# Patient Record
Sex: FEMALE | Race: WHITE | URBAN - METROPOLITAN AREA
[De-identification: names, ages, dates, MRNs, and addresses within clinical notes are randomized per-mention and may not be internally consistent; named-entity substitution may affect disease eponyms.]

---

## 2019-07-12 NOTE — PROGRESS NOTES
[address] [place] [address] Mathias Moritz [address], [address] [facility] [phone]  F [phone]    Office Note  Patient ID:  Name:  Bridget Callejas  MRN:  [id number]  :  1955/63 y.o. Date:  7/15/2019      HISTORY OF PRESENT ILLNESS:  Bridget Callejas is a 61 y.o.  postmenopausal female who is being seen for recurrent, metastatic, endometrial cancer. She is referred by Dr. Natanael Jimenez, medical oncology, in [address] [place] [address]. Dr. Reyna Gil saw her as a consult in the hospital there for pelvic masses. She had presented to the hospital with abdominal pain and had reported poor appetite and weight loss over the last year. A CT scan in revealed a large, lobulated, well-defined, soft tissue mass in the right pelvis with central necrosis, a smaller mass in the left pelvis, and multiple rim-enhancing lesions in the liver. She was also noted to have left hydronephrosis and ultimately had a left nephrostomy tube placed. An antegrade stent was placed later and the nephrostomy was removed. She reported that she was diagnosed with endometrial cancer in  and underwent a robotic hysterectomy with Dr. Neo Peterson at [facility]. She reports being told that the cancer was confined to the endometrium. She was not recommended any adjuvant therapy after surgery and was followed for 5 years without evidence of recurrence. A biopsy of the right-sided mass revealed an adenocarcinoma that was positive for ER/[place] and CK7 and was negative for CK20 and TTF1. These findings would be consistent with a recurrent endometrial cancer. A CA 19-9 was markedly elevated at 1244. A CEA was normal at 1.38. I do not see a CA-125 being drawn. I have been asked by Dr. Reyna Gil to see her in consultation for further evaluation and recommendations. She presents today with her son and daughter. Her son is a radiologist at the [employer] [facility] [employer] in [place].          ROS:   and GI review:  Negative  Cardiopulmonary review: Negative   Musculoskeletal:  Negative    A comprehensive review of systems was negative except for that written in the History of Present Illness. , 10 point ROS      OB/GYN ROS:    Hx of robotic TLH, BSO, PLND in   Patient denies any abnormal bleeding or vaginal discharge. Problem List:  There are no active problems to display for this patient. PMH:  Past Medical History:   Diagnosis Date    Endometrial cancer ([address]       PSH:  Past Surgical History:   Procedure Laterality Date    HX LITHOTRIPSY      HX OTHER SURGICAL  2019    stint left kidney    HX GABRIELE AND BSO      Endometrial Cancer  [facility]      Social History:  Social History     Tobacco Use    Smoking status: Former Smoker    Smokeless tobacco: Never Used   Substance Use Topics    Alcohol use: Not on file      Family History:  Family History   Problem Relation Age of Onset    Breast Cancer Mother       Medications: (reviewed)  Current Outpatient Medications   Medication Sig    LORazepam (ATIVAN) 0.5 mg tablet TAKE 1 TABLET BY MOUTH 3 TIMES A DAY AS NEEDED    megestrol (MEGACE) 40 mg tablet     ondansetron (ZOFRAN ODT) 4 mg disintegrating tablet     PARoxetine (PAXIL) 10 mg tablet      No current facility-administered medications for this visit. Allergies: (reviewed)  No Known Allergies       OBJECTIVE:    Physical Exam:  VITAL SIGNS: Vitals:    07/15/19 1328   BP: 123/85   Pulse: (!) 111   Weight: 157 lb 3.2 oz (71.3 kg)   Height: 5' 5\" (1.651 m)     Body mass index is 26.16 kg/m². GENERAL SHERRILL: Conversant, alert, oriented. No acute distress. HEENT: HEENT. No thyroid enlargement. No JVD. Neck: Supple without restrictions. RESPIRATORY: Clear to auscultation and percussion to the bases. No CVAT. CARDIOVASC: RRR without murmur/rub.    GASTROINT: soft, non-distended, palpable mass on the right side of the abdomen that was mildly tender   MUSCULOSKEL: no joint tenderness, deformity or swelling   EXTREMITIES: extremities normal, atraumatic, no cyanosis or edema   PELVIC: Normal external genitalia. Normal vagina. Cervix, uterus, and adnexa surgically absent. Firm, fixed, mass at left vaginal apex. Tender on exam.     RECTAL: Deferred   EDUARDA SURVEY: No suspicious lymphadenopathy or edema noted. NEURO: Grossly intact. No acute deficit. Lab Data:    No results found for: WBC, HGB, HCT, PLT, MCV, HGBEXT, HCTEXT, PLTEXT, HGBEXT, HCTEXT, PLTEXT  No results found for: NA, K, CL, CO2, AGAP, GLU, BUN, CREA, BUCR, GFRAA, GFRNA, CA      CT of abdomen/pelvis (6/24/19) - [facility]  There are a total of four rim-enhancing hypodensities in the liver largest in hepatic segment 7 measuring 2.8 cm and likely represent hepatic metastases. Liver is otherwise normal. Hyperdense sludge layering in the dependent position of the gallbladder, otherwise the gallbladder appears normal. No biliary distention. Nonspecific 6 mm hypodensity in the anterior portion of the spleen likely benign, otherwise spleen appears normal. Pancreas, adrenal glands and right kidney appear normal.    Slight decreased excretion of left kidney with severe hydroureteronephrosis and perinephric stranding on the left. Ureter is distended to the pelvis at the level of a lobulated well-defined soft tissue mass measuring 3.4 x 3.5 cm in the left pelvis. Additional larger lobulated well-defined mass right pelvis along the iliopsoas muscle measures 12.7 x 11.4 cm and contains central hypodensity suggesting central necrosis. Normal abdominal aorta. Patent aortic branches. Unopacified bowel appears normal except for medial displacement of the cecum due to the aforementioned right pelvic mass. Normal appendix. Normal bladder. Previous hysterectomy. Bone windows demonstrate no aggressive osseous lesions. Included lower lobes are clear. IMPRESSION:  1.  Large lobulated well-defined soft tissue mass in the right pelvis with central necrosis, smaller mass in the left pelvis and multiple rim-enhancing lesions in the liver. Findings compatible with a malignant process likely metastatic to the liver. Patient has a history of endometrial carcinoma. These are amenable to percutaneous biopsy. 2. Left pelvic mass causing severe left hydroureteronephrosis. CT of chest (6/25/19) - [facility]  No filling defect is identified within the excellently opacified pulmonary artery trunk, main pulmonary arteries, or further arterial divisions out to the third and fourth order branches. No architectural lung distortion or pleural effusion is identified. BIbasilar ill-defined opacity is noted consistent with subsegmental atelectasis. No hilar or mediastinal adenopathy is identified. Thoracic aorta does not demonstrate any dissection or aneurysm. Great vessels of the aortic arch are unremarkable. The heart is unremarkable. No pericardial effusion is identified. To the extent the adrenal glands are visualized, they are unremarkable. Upper right hepatic lobe lesion 2.8 cm is again noted. There is continued left renal abnormality and decreased hydronephrosis with upper portion of left ureteral stent now identified. No concerning osseous lesion is noted. Degenerative change of the spine noted with osteoporosis. No spine, sternal or rib lesion is noted. No axillary adenopathy is noted. Heterogeneous thyroid gland. Hypodense area 0.5 cm noted in the right thyroid lobe. IMPRESSION:  No evidence of pulmonary embolism. Minimal bibasilar atelectasis is noted. Nodular thyroid gland. 0.5 cm area of hypodensity in the right thyroid lobe noted. Full evaluation of thyroid gland with thyroid ultrasound ideally obtained on a routine outpatient basis can be provided. IMPRESSION/PLAN:  Tanmay Jansen is a 61 y.o. female with a diagnosis of recurrent endometrial cancer.   I reviewed with Tanmay Jansen her medical records, physical exam, and review of symptoms. I personally reviewed the CT images with her and her family an discussed the findings. She has a large fixed mass on the right side of the abdomen, extending from the pelvis to the kidney, pushing the right colon medially, possibly involving the psoas and iliacus muscles. She also has a smaller, fixed mass on the left side of the pelvis which was causing left sided hydroureteronephrosis. She has multiple large liver lesions noted as well. In addition to those findings, she appears to have multiple enlarged mesenteric lymph nodes and a few peritoneal nodules, none of which were mentioned in the report. She does not have any significant retroperitoneal lymphadenopathy or ascites. I had a long discussion with her and her family and explained that she is not a candidate for surgical resection. Her best treatment option at this time would be chemotherapy. The gold standard would be the combination of Taxol/Carboplatin. I would treat with 2-3 cycles before repeating imaging to assess response. If she is responding, I would treat for 2-3 more cycles before repeating another scan. At some point she might be a candidate to switch to hormonal therapy, as she did exhibit ER/OK positivity. An aromatase inhibitor or an oral progesterone would be an option. I would recommend checking a CA-125 at this time to see if it is a marker to follow. I would follow the CA 19-9 as well, since it was elevated. I would also recommend having her tumor tested for MSI. If she is positive, immunotherapy with Pembrolizumab would be an option for her once she fails standard therapy. She is scheduled for a PET/CT later this week. She would like to have her treatment in [place] to be closer to home, which is understandable. I will remain available for consultation and recommendations during her therapy.       Signed By: Patti Vargas MD     7/15/2019/1:58 PM

## 2019-07-15 ENCOUNTER — OFFICE VISIT (OUTPATIENT)
Dept: GYNECOLOGY | Age: 64
End: 2019-07-15

## 2019-07-15 VITALS
SYSTOLIC BLOOD PRESSURE: 123 MMHG | BODY MASS INDEX: 26.19 KG/M2 | HEART RATE: 111 BPM | DIASTOLIC BLOOD PRESSURE: 85 MMHG | WEIGHT: 157.2 LBS | HEIGHT: 65 IN

## 2019-07-15 DIAGNOSIS — C54.1 ENDOMETRIAL CANCER (HCC): Primary | ICD-10-CM

## 2019-07-15 RX ORDER — ONDANSETRON 4 MG/1
TABLET, ORALLY DISINTEGRATING ORAL
COMMUNITY
Start: 2019-06-10

## 2019-07-15 RX ORDER — LORAZEPAM 0.5 MG/1
TABLET ORAL
Refills: 0 | COMMUNITY
Start: 2019-07-03

## 2019-07-15 RX ORDER — PAROXETINE 10 MG/1
TABLET, FILM COATED ORAL
COMMUNITY
Start: 2019-07-12

## 2019-07-15 RX ORDER — MEGESTROL ACETATE 40 MG/1
TABLET ORAL
COMMUNITY
Start: 2019-07-12

## 2019-07-15 NOTE — LETTER
7/15/2019 3:11 PM 
 
Patient:  Emily Wells YOB: 1955 Date of Visit: 7/15/2019 Dear Marianne Li MD 
VIA Facsimile: [phone] 
 : Thank you for referring Ms. Emily Wells to me for evaluation/treatment. Below are the relevant portions of my assessment and plan of care. [address], [address] 
P [phone]  F [phone] Office Note Patient ID: 
Name:  Emily Wells MRN:  [id number] :  1955/63 y.o. Date:  7/15/2019 HISTORY OF PRESENT ILLNESS: 
Emily Wells is a 61 y.o.  postmenopausal female who is being seen for recurrent, metastatic, endometrial cancer. She is referred by Dr. Marianne Li, medical oncology, in [facility]. Dr. Mitzy Shabazz saw her as a consult in the hospital there for pelvic masses. She had presented to the hospital with abdominal pain and had reported poor appetite and weight loss over the last year. A CT scan in revealed a large, lobulated, well-defined, soft tissue mass in the right pelvis with central necrosis, a smaller mass in the left pelvis, and multiple rim-enhancing lesions in the liver. She was also noted to have left hydronephrosis and ultimately had a left nephrostomy tube placed. An antegrade stent was placed later and the nephrostomy was removed. She reported that she was diagnosed with endometrial cancer in  and underwent a robotic hysterectomy with Dr. Meka Lerma at [facility]. She reports being told that the cancer was confined to the endometrium. She was not recommended any adjuvant therapy after surgery and was followed for 5 years without evidence of recurrence. A biopsy of the right-sided mass revealed an adenocarcinoma that was positive for ER/DC and CK7 and was negative for CK20 and TTF1. These findings would be consistent with a recurrent endometrial cancer. A CA 19-9 was markedly elevated at 1244.   A CEA was normal at 1.38. I do not see a CA-125 being drawn. I have been asked by Dr. Portia Williamson to see her in consultation for further evaluation and recommendations. She presents today with her son and daughter. Her son is a radiologist at the [facility] in [place]. ROS: 
 and GI review:  Negative Cardiopulmonary review:  Negative Musculoskeletal:  Negative A comprehensive review of systems was negative except for that written in the History of Present Illness. , 10 point ROS 
 
 
OB/GYN ROS: 
 Hx of robotic TLH, BSO, PLND in  Patient denies any abnormal bleeding or vaginal discharge. Problem List: 
There are no active problems to display for this patient. PMH: 
Past Medical History:  
Diagnosis Date  Endometrial cancer ([facility] [address] PSH: 
Past Surgical History:  
Procedure Laterality Date  HX LITHOTRIPSY  HX OTHER SURGICAL  2019  
 stint left kidney  HX GABRIELE AND BSO Endometrial Cancer  [facility] Social History: 
Social History Tobacco Use  Smoking status: Former Smoker  Smokeless tobacco: Never Used Substance Use Topics  Alcohol use: Not on file Family History: 
Family History Problem Relation Age of Onset  Breast Cancer Mother Medications: (reviewed) Current Outpatient Medications Medication Sig  LORazepam (ATIVAN) 0.5 mg tablet TAKE 1 TABLET BY MOUTH 3 TIMES A DAY AS NEEDED  
 megestrol (MEGACE) 40 mg tablet  ondansetron (ZOFRAN ODT) 4 mg disintegrating tablet  PARoxetine (PAXIL) 10 mg tablet No current facility-administered medications for this visit. Allergies: (reviewed) No Known Allergies OBJECTIVE: 
 
Physical Exam: VITAL SIGNS: Vitals:  
 07/15/19 1328 BP: 123/85 Pulse: (!) 111 Weight: 157 lb 3.2 oz (71.3 kg) Height: 5' 5\" (1.651 m) Body mass index is 26.16 kg/m². GENERAL SHERRILL: Conversant, alert, oriented. No acute distress. HEENT: HEENT. No thyroid enlargement. No JVD. Neck: Supple without restrictions. RESPIRATORY: Clear to auscultation and percussion to the bases. No CVAT. CARDIOVASC: RRR without murmur/rub. GASTROINT: soft, non-distended, palpable mass on the right side of the abdomen that was mildly tender MUSCULOSKEL: no joint tenderness, deformity or swelling EXTREMITIES: extremities normal, atraumatic, no cyanosis or edema PELVIC: Normal external genitalia. Normal vagina. Cervix, uterus, and adnexa surgically absent. Firm, fixed, mass at left vaginal apex. Tender on exam.    
RECTAL: Deferred EDUARDA SURVEY: No suspicious lymphadenopathy or edema noted. NEURO: Grossly intact. No acute deficit. Lab Data: 
 
No results found for: WBC, HGB, HCT, PLT, MCV, HGBEXT, HCTEXT, PLTEXT, HGBEXT, HCTEXT, PLTEXT No results found for: NA, K, CL, CO2, AGAP, GLU, BUN, CREA, BUCR, GFRAA, GFRNA, CA 
 
 
CT of abdomen/pelvis (6/24/19) - [facility] 
There are a total of four rim-enhancing hypodensities in the liver largest in hepatic segment 7 measuring 2.8 cm and likely represent hepatic metastases. Liver is otherwise normal. Hyperdense sludge layering in the dependent position of the gallbladder, otherwise the gallbladder appears normal. No biliary distention. Nonspecific 6 mm hypodensity in the anterior portion of the spleen likely benign, otherwise spleen appears normal. Pancreas, adrenal glands and right kidney appear normal. 
 
Slight decreased excretion of left kidney with severe hydroureteronephrosis and perinephric stranding on the left. Ureter is distended to the pelvis at the level of a lobulated well-defined soft tissue mass measuring 3.4 x 3.5 cm in the left pelvis. Additional larger lobulated well-defined mass right pelvis along the iliopsoas muscle measures 12.7 x 11.4 cm and contains central hypodensity suggesting central necrosis. Normal abdominal aorta. Patent aortic branches. Unopacified bowel appears normal except for medial displacement of the cecum due to the aforementioned right pelvic mass. Normal appendix. Normal bladder. Previous hysterectomy. Bone windows demonstrate no aggressive osseous lesions. Included lower lobes are clear. IMPRESSION: 
1. Large lobulated well-defined soft tissue mass in the right pelvis with central necrosis, smaller mass in the left pelvis and multiple rim-enhancing lesions in the liver. Findings compatible with a malignant process likely metastatic to the liver. Patient has a history of endometrial carcinoma. These are amenable to percutaneous biopsy. 2. Left pelvic mass causing severe left hydroureteronephrosis. CT of chest (6/25/19) - [facility] 
No filling defect is identified within the excellently opacified pulmonary artery trunk, main pulmonary arteries, or further arterial divisions out to the third and fourth order branches. No architectural lung distortion or pleural effusion is identified. BIbasilar ill-defined opacity is noted consistent with subsegmental atelectasis. No hilar or mediastinal adenopathy is identified. Thoracic aorta does not demonstrate any dissection or aneurysm. Great vessels of the aortic arch are unremarkable. The heart is unremarkable. No pericardial effusion is identified. To the extent the adrenal glands are visualized, they are unremarkable. Upper right hepatic lobe lesion 2.8 cm is again noted. There is continued left renal abnormality and decreased hydronephrosis with upper portion of left ureteral stent now identified. No concerning osseous lesion is noted. Degenerative change of the spine noted with osteoporosis. No spine, sternal or rib lesion is noted. No axillary adenopathy is noted. Heterogeneous thyroid gland. Hypodense area 0.5 cm noted in the right thyroid lobe.  
 
IMPRESSION: 
 No evidence of pulmonary embolism. Minimal bibasilar atelectasis is noted. Nodular thyroid gland. 0.5 cm area of hypodensity in the right thyroid lobe noted. Full evaluation of thyroid gland with thyroid ultrasound ideally obtained on a routine outpatient basis can be provided. IMPRESSION/PLAN: 
Beth Gill is a 61 y.o. female with a diagnosis of recurrent endometrial cancer. I reviewed with Beth Gill her medical records, physical exam, and review of symptoms. I personally reviewed the CT images with her and her family an discussed the findings. She has a large fixed mass on the right side of the abdomen, extending from the pelvis to the kidney, pushing the right colon medially, possibly involving the psoas and iliacus muscles. She also has a smaller, fixed mass on the left side of the pelvis which was causing left sided hydroureteronephrosis. She has multiple large liver lesions noted as well. In addition to those findings, she appears to have multiple enlarged mesenteric lymph nodes and a few peritoneal nodules, none of which were mentioned in the report. She does not have any significant retroperitoneal lymphadenopathy or ascites. I had a long discussion with her and her family and explained that she is not a candidate for surgical resection. Her best treatment option at this time would be chemotherapy. The gold standard would be the combination of Taxol/Carboplatin. I would treat with 2-3 cycles before repeating imaging to assess response. If she is responding, I would treat for 2-3 more cycles before repeating another scan. At some point she might be a candidate to switch to hormonal therapy, as she did exhibit ER/NE positivity. An aromatase inhibitor or an oral progesterone would be an option. I would recommend checking a CA-125 at this time to see if it is a marker to follow.   I would follow the CA 19-9 as well, since it was elevated. I would also recommend having her tumor tested for MSI. If she is positive, immunotherapy with Pembrolizumab would be an option for her once she fails standard therapy. She is scheduled for a PET/CT later this week. She would like to have her treatment in [place] to be closer to home, which is understandable. I will remain available for consultation and recommendations during her therapy. Signed By: Ab Villalobos MD   
 7/15/2019/1:58 PM  
 
 
 
If you have questions, please do not hesitate to call me. I look forward to following Ms. Delfino Bowman along with you.  
 
 
 
Sincerely, 
 
 
Ab Villalobos MD

## 2019-07-23 NOTE — PROGRESS NOTES
Records received from [facility] for review. She underwent an uncomplicated robotic hysterectomy with lymph node dissection by Dr. Antionette Chirinos on 8/19/10. Pathology revealed a stage IA, grade 1, endometrial carcinoma. She had only 2 mm myometrial invasion and she had no LVSI. She had 16 negative pelvic nodes and 8 negative paraaortic nodes. Based upon the favorable pathology she was not recommended any adjuvant therapy. She was followed for 5 years at [facility] without evidence of disease.     Ab Villalobos MD

## 2019-12-23 ENCOUNTER — OFFICE VISIT (OUTPATIENT)
Dept: GYNECOLOGY | Age: 64
End: 2019-12-23

## 2019-12-23 VITALS
SYSTOLIC BLOOD PRESSURE: 131 MMHG | WEIGHT: 166 LBS | HEART RATE: 106 BPM | DIASTOLIC BLOOD PRESSURE: 84 MMHG | HEIGHT: 65 IN | BODY MASS INDEX: 27.66 KG/M2

## 2019-12-23 DIAGNOSIS — C54.1 ENDOMETRIAL CANCER (HCC): Primary | ICD-10-CM

## 2019-12-23 RX ORDER — PROCHLORPERAZINE MALEATE 10 MG
10 TABLET ORAL
COMMUNITY
Start: 2019-07-26

## 2019-12-23 RX ORDER — LORATADINE 10 MG/1
10 TABLET ORAL
COMMUNITY

## 2019-12-23 RX ORDER — DEXAMETHASONE 4 MG/1
TABLET ORAL
COMMUNITY
Start: 2019-12-10

## 2019-12-23 RX ORDER — AMOXICILLIN 250 MG
1 CAPSULE ORAL
COMMUNITY

## 2019-12-23 RX ORDER — IBUPROFEN 200 MG
200 TABLET ORAL
COMMUNITY

## 2019-12-23 NOTE — PROGRESS NOTES
[address] [place] [address] [address]ias [address], [address]  P [phone]  F [phone]    Office Note  Patient ID:  Name:  Milton Kim  MRN:  [id number]  :  1955/64 y.o. Date:  2019      HISTORY OF PRESENT ILLNESS:  Milton Kim is a 59 y.o.  postmenopausal female who was referred to me in 2019 for recurrent, metastatic, endometrial cancer by Dr. Abdirashid Simms, medical oncology, in [facility]. Dr. Emil Mancuso saw her as a consult in [facility] for pelvic masses. She had presented to the hospital with abdominal pain and had reported poor appetite and weight loss over the last year. A CT scan in revealed a large, lobulated, well-defined, soft tissue mass in the right pelvis with central necrosis, a smaller mass in the left pelvis, and multiple rim-enhancing lesions in the liver. She was also noted to have left hydronephrosis and ultimately had a left nephrostomy tube placed. An antegrade stent was placed later and the nephrostomy was removed. She reported that she was diagnosed with endometrial cancer in  and underwent a robotic hysterectomy with Dr. Emelina Hernandez at [facility]. She reported being told that the cancer was confined to the endometrium. She was not recommended any adjuvant therapy after surgery and was followed for 5 years without evidence of recurrence. A biopsy of the right-sided mass revealed an adenocarcinoma that was positive for ER/FL and CK7 and was negative for CK20 and TTF1. These findings would be consistent with a recurrent endometrial cancer. A CA 19-9 was markedly elevated at 1244. A CEA was normal at 1.38. I do not see a CA-125 being drawn. I was asked by Dr. Emil Mancuso to see her in consultation for further evaluation and recommendations. She presented with her son and daughter. Her son is a radiologist at the [facility] [employer] in [place].       I personally reviewed the CT images with her and her family an discussed the findings. She had a large fixed mass on the right side of the abdomen, extending from the pelvis to the kidney, pushing the right colon medially, possibly involving the psoas and iliacus muscles. She also has a smaller, fixed mass on the left side of the pelvis which was causing left sided hydroureteronephrosis. She had multiple large liver lesions noted as well. In addition to those findings, she appeared to have multiple enlarged mesenteric lymph nodes and a few peritoneal nodules, none of which were mentioned in the report. She did not have any significant retroperitoneal lymphadenopathy or ascites. I had a long discussion with her and her family and explained that she was not a candidate for surgical resection. Her best treatment option would be chemotherapy. The gold standard would be the combination of Taxol/Carboplatin. I recommended treating with 2-3 cycles before repeating any imaging to assess response. If she responds, I would treat for 2-3 more cycles before repeating another scan. At some point she might be a candidate to switch to hormonal therapy, as she did exhibit ER/NV positivity. An aromatase inhibitor or an oral progesterone would be an option. I also recommended having her tumor tested for MSI. If she were positive, immunotherapy with Pembrolizumab as a single agent would be an option for her once she fails standard therapy. She wished to have her treatment in [place] to be closer to home, which I felt was understandable. I explained that I would remain available for consultation and recommendations during her therapy. She presents today for follow-up and to discuss further treatment options. She recently had a follow-up CT to assess her response to therapy. She has completed 7 cycles of Taxol/Carboplatin chemotherapy so far and appears to still be responding. The large right sided abdominal mass has significantly improved.   There is also a mass in the left pelvis that is much smaller. The liver lesions persist, of which some are bigger, while others are smaller. Overall she seems to be doing well. She has minimal neuropathy symptoms from her chemotherapy. ROS:   and GI review:  Negative  Cardiopulmonary review:  Negative   Musculoskeletal:  Negative    A comprehensive review of systems was negative except for that written in the History of Present Illness. , 10 point ROS      OB/GYN ROS:    Hx of robotic TLH, BSO, PLND in   Patient denies any abnormal bleeding or vaginal discharge. Problem List:  There are no active problems to display for this patient. PMH:  Past Medical History:   Diagnosis Date    Endometrial cancer ([facility] [address]       PSH:  Past Surgical History:   Procedure Laterality Date    HX LITHOTRIPSY      HX OTHER SURGICAL  2019    stint left kidney    HX GABRIELE AND BSO      Endometrial Cancer  [facility]      Social History:  Social History     Tobacco Use    Smoking status: Former Smoker    Smokeless tobacco: Never Used   Substance Use Topics    Alcohol use: Not on file      Family History:  Family History   Problem Relation Age of Onset    Breast Cancer Mother       Medications: (reviewed)  Current Outpatient Medications   Medication Sig    pegfilgrastim (NEULASTA) 6 mg/0.6mL injection 6 mg by SubCUTAneous route.  prochlorperazine (COMPAZINE) 10 mg tablet 10 mg.    VITAMIN B COMPLEX PO Take 1 Tab by mouth.  pyridoxine HCl, vitamin B6, (VITAMIN B-6 PO) Take  by mouth.  PARoxetine (PAXIL) 10 mg tablet     dexAMETHasone (DECADRON) 4 mg tablet     ibuprofen (MOTRIN) 200 mg tablet Take 200 mg by mouth.  loratadine (CLARITIN) 10 mg tablet Take 10 mg by mouth.  senna-docusate (PERICOLACE) 8.6-50 mg per tablet Take 1 Tab by mouth.     LORazepam (ATIVAN) 0.5 mg tablet TAKE 1 TABLET BY MOUTH 3 TIMES A DAY AS NEEDED    megestrol (MEGACE) 40 mg tablet     ondansetron (ZOFRAN ODT) 4 mg disintegrating tablet      No current facility-administered medications for this visit. Allergies: (reviewed)  No Known Allergies       OBJECTIVE:    Physical Exam:  VITAL SIGNS: Vitals:    12/23/19 1412   BP: 131/84   Pulse: (!) 106   Weight: 166 lb (75.3 kg)   Height: 5' 5\" (1.651 m)     Body mass index is 27.62 kg/m². GENERAL SHERRILL: Conversant, alert, oriented. No acute distress. HEENT: HEENT. No thyroid enlargement. No JVD. Neck: Supple without restrictions. RESPIRATORY: Clear to auscultation and percussion to the bases. No CVAT. CARDIOVASC: RRR without murmur/rub. GASTROINT: soft, non-distended, palpable mass on the right side of the abdomen that was mildly tender   MUSCULOSKEL: no joint tenderness, deformity or swelling   EXTREMITIES: extremities normal, atraumatic, no cyanosis or edema   PELVIC: Normal external genitalia. Normal vagina. Cervix, uterus, and adnexa surgically absent. Firm, fixed, mass at left vaginal apex. Tender on exam.     RECTAL: Deferred   EDUARDA SURVEY: No suspicious lymphadenopathy or edema noted. NEURO: Grossly intact. No acute deficit. Lab Data:    No results found for: WBC, HGB, HCT, PLT, MCV, HGBEXT, HCTEXT, PLTEXT, HGBEXT, HCTEXT, PLTEXT  No results found for: NA, K, CL, CO2, AGAP, GLU, BUN, CREA, BUCR, GFRAA, GFRNA, CA      CT of abdomen/pelvis (6/24/19) - [facility]  There are a total of four rim-enhancing hypodensities in the liver largest in hepatic segment 7 measuring 2.8 cm and likely represent hepatic metastases. Liver is otherwise normal. Hyperdense sludge layering in the dependent position of the gallbladder, otherwise the gallbladder appears normal. No biliary distention. Nonspecific 6 mm hypodensity in the anterior portion of the spleen likely benign, otherwise spleen appears normal. Pancreas, adrenal glands and right kidney appear normal.    Slight decreased excretion of left kidney with severe hydroureteronephrosis and perinephric stranding on the left.  Ureter is distended to the pelvis at the level of a lobulated well-defined soft tissue mass measuring 3.4 x 3.5 cm in the left pelvis. Additional larger lobulated well-defined mass right pelvis along the iliopsoas muscle measures 12.7 x 11.4 cm and contains central hypodensity suggesting central necrosis. Normal abdominal aorta. Patent aortic branches. Unopacified bowel appears normal except for medial displacement of the cecum due to the aforementioned right pelvic mass. Normal appendix. Normal bladder. Previous hysterectomy. Bone windows demonstrate no aggressive osseous lesions. Included lower lobes are clear. IMPRESSION:  1. Large lobulated well-defined soft tissue mass in the right pelvis with central necrosis, smaller mass in the left pelvis and multiple rim-enhancing lesions in the liver. Findings compatible with a malignant process likely metastatic to the liver. Patient has a history of endometrial carcinoma. These are amenable to percutaneous biopsy. 2. Left pelvic mass causing severe left hydroureteronephrosis. CT of chest (6/25/19) - [facility]  No filling defect is identified within the excellently opacified pulmonary artery trunk, main pulmonary arteries, or further arterial divisions out to the third and fourth order branches. No architectural lung distortion or pleural effusion is identified. BIbasilar ill-defined opacity is noted consistent with subsegmental atelectasis. No hilar or mediastinal adenopathy is identified. Thoracic aorta does not demonstrate any dissection or aneurysm. Great vessels of the aortic arch are unremarkable. The heart is unremarkable. No pericardial effusion is identified. To the extent the adrenal glands are visualized, they are unremarkable. Upper right hepatic lobe lesion 2.8 cm is again noted. There is continued left renal abnormality and decreased hydronephrosis with upper portion of left ureteral stent now identified.     No concerning osseous lesion is noted. Degenerative change of the spine noted with osteoporosis. No spine, sternal or rib lesion is noted. No axillary adenopathy is noted. Heterogeneous thyroid gland. Hypodense area 0.5 cm noted in the right thyroid lobe. IMPRESSION:  No evidence of pulmonary embolism. Minimal bibasilar atelectasis is noted. Nodular thyroid gland. 0.5 cm area of hypodensity in the right thyroid lobe noted. Full evaluation of thyroid gland with thyroid ultrasound ideally obtained on a routine outpatient basis can be provided. PET/CT (7/19/19) - [facility]  Multifocal abnormal hypermetabolism is identified throughout the imaged structures. These include, but are not limited to, the following:    A large hypermetabolic centrally photopenic/necrotic mass in the right hemipelvis extends up to the inferior liver margin measuring 14.3 x 12.5 cm in AP and transverse dimension and 15 cm in craniocaudal dimension on CT slice position -457. 11. This lesion has an SUV max of 22. A hypermetabolic mass is noted in the inferior left hemipelvis measuring 4.1 x 4.8 cm in transverse and AP dimension on slice position -467.04 possessing an SUV max of 15. A small presacral nodule is hypermetabolic and measures 1.5 cm in diameter on slice position -044.36, possessing an SUV max of 14. Five hypermetabolic hepatic masses are present, difficult to define on the low dose CT scan due to lack of IV contrast. There is a hypermetabolic right hepatic lobe mass on CT slice position -260. 72 which possesses an SUV max of 13. A hypermetabolic posterior left neck focus may correspond to a thyroid nodule though this is difficult to definitely identify. It possesses an SUV max of 8.4 on slice position -535.14.     Otherwise, there is physiologic distribution of the radiopharmaceutical throughout the imaged structures with expected normal uptake in the imaged brain, myocardium, liver, spleen,  system and imaged bowel loops. On the low dose, unenhanced CT scan performed for localization and attenuation correction purposes only, concentric small pericardial effusion, not metabolically active. Gallbladder sludge. Left nephroureteral stent. IMPRESSION:  1. Tumor recurrence in 3 pelvic masses as discussed above. 2. Hypermetabolic hepatic metastases involving the left and right lobes. 3. Hypermetabolic focus in the left neck may represent a thyroid nodule. Correlation with thyroid ultrasound would be helpful to confirm. Histologic evaluation is recommended if confirmed. A primary thyroid neoplasm is not excluded. CT of chest/abdomen/pelvis (9/26/19) - [facility]  CHEST: Right Mediport tip in the cavoatrial junction. Visualized thyroid within normal limits. Normal cardiac size. Small to moderate pericardial effusion measures 12 mm in maximal thickness, not significantly changed. Three-vessel aortic arch. Thoracic aorta is normal in caliber. No central pulmonary embolism. No adenopathy. Stable 3 mm micronodule along the right major fissure medially on slice position 65.9. No focal consolidation or lung mass. No endotracheal lesion, pleural effusion, or pneumothorax. ABDOMEN/PELVIS: There is a 2.3 cm hypodense mass in the right liver dome/segment 8 on slice position 044.8 which previously measured 2.8 cm in June 24, 2019; a 1.4 cm hypodense mass in segment 4A on slice position 071.2 which previously measured 1.2 cm in June 24, 2019; a 1.8 cm hypodense mass in segment 2 on slice position 896.1 which previously measured 1.8 cm in June 24, 2019; a 0.8 cm hypodense mass in segment 5 on slice position 901.9 which was not present on the CT from June 24, 2019; and a 1.1 cm hypodense mass in liver segment 3 on slice position 528.7 which previously measured 1.1 cm in June 24, 2019. These liver masses were metabolically active on the PET/CT from July 19, 2019.  Please note that it is difficult to determine interval change in size of these liver lesions from the PET/CT from 7/19/2019 given the lack of intravenous contrast at that time. Additionally, there is a 0.8 cm hypodense lesion at the junction of segment 4A/2 on slice position 985.4 which is not significantly changed from June 24, 8933, and not metabolically active on the PET/CT. Lastly, there is a 0.6 cm hypodense mass in segment IVb of the liver on slice position 984.6, unchanged from June 24, 1189 and not metabolically active on PET/CT. Stable 9 mm anterior splenic hypodensity. Cholelithiasis. Pancreas, biliary tree, and adrenals within normal limits. Symmetric renal enhancement. Left ureteral stent identified. There is moderate left hydroureteronephrosis. Soft tissue thickening encompasses the left ureter, and there is urothelial enhancement of the left renal pelvis. No bowel obstruction or bowel wall thickening. At a similar slice position and orientation, the large heterogeneously enhancing mass in the right hemipelvis has decreased in size and measures approximately 12.4 x 11.0 cm, previously 14.3 x 12.5 cm on the PET/CT from July 2019. A 2.5 x 2.6 cm mass in the left hemipelvis on slice position 094.2 has decreased in size, previously measuring 4.1 x 4.8 cm. Lastly, a 1 x 1 cm presacral node on slice position 102.7 has decreased in size as well, previously measuring 1.2 x 1.5 cm. There is a 0.5 cm soft tissue nodule in the right ventral abdomen on slice position 981.4, decreased as this measured approximately 1.1 cm on the PET/CT. Several mildly prominent periportal nodes measuring up to 12 mm are unchanged from June 2019. No ascites or free air. Underdistended bladder. Hysterectomy. Skeletal: No aggressive osseous lesions or acute osseous abnormality. IMPRESSION:  1.  Since the PET/CT of July 2019, there has been interval decrease in size of the three pelvic masses and decrease in size of a soft tissue nodule in the right ventral abdomen and presacral space. 2. Multiple hepatic metastasis as detailed above, some decreased in size while others are not significantly changed when compared to the CT from June 24, 2019. There is  a lesion in liver segment 5 which was metabolically active on PET CT from 7/19/2019, however not present on the CT from June 24, 2019. As such, findings suggest a mixed response to therapy in the liver. 3. Stable indeterminate splenic hypodensity, not previously metabolically active. Recommend continued attention on follow-up exams to exclude malignancy. 4. Cholelithiasis. CT of chest/abdomen/pelvis (12/6/19) - [facility]  Thoracic CT: The lungs are clear without segmental or subsegmental alveolar infiltrate or pleural or pericardial effusion. Coarse linear scarring is noted at the lung bases left greater than right. The heart is normal in size. No pathologically enlarged mediastinal, hilar, or axillary lymph nodes are identified at this time. A central catheter enters via the right internal jugular vein with its tip at the atriocaval junction, connected to subcutaneous access port. Visualized portions of the thyroid appear unremarkable. There is a small sliding-type hiatal hernia. Abdominal CT:  Multiple hypoattenuating foci are noted throughout the liver as seen previously, the most prominent measuring 1.9 cm in diameter within the posterior superior right hepatic lobe, Couinaud's segment 7 and additional foci within the Deb segment and left hepatic lobes, unchanged to slightly decreased in size as compared to prior examination of 9/25/2019. A persistent 1.0 cm focus is noted in the IVb segment of the right hepatic lobe at transaxial slice position 661.5 which now demonstrates enhancement and is consistent with an additional metastatic deposit.  There is been no interval change in a 1.0 x 0.7 cm hypoattenuating focus at the surface of the anterolateral spleen at transaxial slice position 661.0. The pancreas, gallbladder, and adrenal glands appear unremarkable. There is persistent left-sided hydronephrosis with mural thickening involving the left renal collecting system and proximal left ureter despite the presence of a pre-existing left ureteral calculus. Otherwise, the kidneys appear unremarkable. No pathologically enlarged para-aortic lymph nodes are seen. Pelvic CT:  Once again noted is a dominant heterogeneous partially enhancing mass within the right iliac fossa, however this has decreased significantly in size as compared to CT examination of 9/25/2019, measuring 8.1 x 6.7 x 8.9 cm in greatest AP, transverse, and superoinferior dimensions respectively. On prior examination this mass measured 12.4 x 11.0 cm in greatest AP and transverse dimensions respectively. No pathologically enlarged iliac or inguinal lymph nodes are seen. The urinary bladder appears within normal limits. There has been a hysterectomy. No free fluid collections are noted in the abdomen or pelvis at this time. There is a left internal hernia containing only fat. CT evaluation of the osseous structures of the thorax, lumbosacral spines, and pelvis demonstrates subcentimeter sclerotic foci within the left acetabular roof and posteromedial left iliac bone unchanged as compared to prior examinations, possibly representing bone islands. There remains normal alignment of the vertebral bodies of the thoracolumbar spine with preservation of vertebral body heights, thoracic kyphosis, and lumbar lordosis without evidence of thoracic subluxation or lumbar spondylolisthesis. IMPRESSION:  Interval decrease in size of nonetheless persistent enhancing right iliac fossa mass as well as no change to slight interval decrease in size and hepatic metastatic deposits and surface splenic lesion as compared to CT examination of 9/25/2019.  No interval change in left-sided hydronephrosis and hydroureter despite left-sided rib ureteral stent with persistent enhancing mural thickening of the left renal collecting system and proximal left ureter possibly indicating metastatic involvement at those locations as well. Central catheter entering via right internal jugular vein with tip at atriocaval junction, connected to subcutaneous access port. IMPRESSION/PLAN:  Jorge Venegas is a 59 y.o. female with a diagnosis of recurrent endometrial cancer. I again reviewed with Jorge Venegas her medical records, physical exam, and review of symptoms. Due to the extent of her recurrence, she was recommended systemic chemotherapy with Taxol/Carboplatin, which she has been receiving in [place] with Dr. Selwyn Laurent. She appears to be responding to therapy so far and appears to be tolerating well. I recommend continuing with the regimen of Taxol/Carboplatin until progression or prohibitive toxicity. I would repeat imaging every 2 cycles. Once she has documented progression on imaging, I would then recommend the combination of [facility], which is newly approved for metastatic endometrial cancer. I briefly discussed some of the potential side effects and toxicity of that regimen. Other options would be Doxil, Avastin, or hormonal therapy, but none of them would have the expected response rate of the new regimen.         Signed By: Ezio Franco MD     12/23/2019/1:58 PM

## 2019-12-23 NOTE — PATIENT INSTRUCTIONS
[employer] Activation    Thank you for requesting access to [employer]. Please follow the instructions below to securely access and download your online medical record. [employer] allows you to send messages to your doctor, view your test results, renew your prescriptions, schedule appointments, and more. How Do I Sign Up? 1. In your internet browser, go to [email or web address]. 2. Click on the First Time User? Click Here link in the Sign In box. You will see the New Member Sign Up page. 3. Enter your [employer] Access Code exactly as it appears below. You will not need to use this code after youve completed the sign-up process. If you do not sign up before the expiration date, you must request a new code. [employer] Access Code: [email or web address]  Expires: 2020  2:11 PM (This is the date your [employer] access code will )    4. Enter the last four digits of your Social Security Number (xxxx) and Date of Birth (mm/dd/yyyy) as indicated and click Submit. You will be taken to the next sign-up page. 5. Create a [employer] ID. This will be your [employer] login ID and cannot be changed, so think of one that is secure and easy to remember. 6. Create a [employer] password. You can change your password at any time. 7. Enter your Password Reset Question and Answer. This can be used at a later time if you forget your password. 8. Enter your e-mail address. You will receive e-mail notification when new information is available in [address] 9. Click Sign Up. You can now view and download portions of your medical record. 10. Click the Download Summary menu link to download a portable copy of your medical information. Additional Information    If you have questions, please visit the Frequently Asked Questions section of the [employer] website at [email or web address] Remember, [employer] is NOT to be used for urgent needs. For medical emergencies, dial 911.

## 2023-05-24 RX ORDER — LORAZEPAM 0.5 MG/1
TABLET ORAL
COMMUNITY
Start: 2019-07-03

## 2023-05-24 RX ORDER — IBUPROFEN 200 MG
200 TABLET ORAL
COMMUNITY

## 2023-05-24 RX ORDER — MEGESTROL ACETATE 40 MG/1
TABLET ORAL
COMMUNITY
Start: 2019-07-12

## 2023-05-24 RX ORDER — PAROXETINE 10 MG/1
TABLET, FILM COATED ORAL
COMMUNITY
Start: 2019-07-12

## 2023-05-24 RX ORDER — ONDANSETRON 4 MG/1
TABLET, ORALLY DISINTEGRATING ORAL
COMMUNITY
Start: 2019-06-10

## 2023-05-24 RX ORDER — AMOXICILLIN 250 MG
1 CAPSULE ORAL
COMMUNITY

## 2023-05-24 RX ORDER — DEXAMETHASONE 4 MG/1
TABLET ORAL
COMMUNITY
Start: 2019-12-10

## 2023-05-24 RX ORDER — LORATADINE 10 MG/1
10 TABLET ORAL
COMMUNITY

## 2023-05-24 RX ORDER — PROCHLORPERAZINE MALEATE 10 MG
10 TABLET ORAL
COMMUNITY
Start: 2019-07-26